# Patient Record
Sex: MALE | Race: WHITE
[De-identification: names, ages, dates, MRNs, and addresses within clinical notes are randomized per-mention and may not be internally consistent; named-entity substitution may affect disease eponyms.]

---

## 2018-02-15 ENCOUNTER — HOSPITAL ENCOUNTER (EMERGENCY)
Dept: HOSPITAL 25 - UCCORT | Age: 23
Discharge: HOME | End: 2018-02-15
Payer: SELF-PAY

## 2018-02-15 VITALS — DIASTOLIC BLOOD PRESSURE: 62 MMHG | SYSTOLIC BLOOD PRESSURE: 122 MMHG

## 2018-02-15 DIAGNOSIS — Z88.2: ICD-10-CM

## 2018-02-15 DIAGNOSIS — L02.416: Primary | ICD-10-CM

## 2018-02-15 DIAGNOSIS — H91.90: ICD-10-CM

## 2018-02-15 DIAGNOSIS — Z88.0: ICD-10-CM

## 2018-02-15 PROCEDURE — 99212 OFFICE O/P EST SF 10 MIN: CPT

## 2018-02-15 PROCEDURE — G0463 HOSPITAL OUTPT CLINIC VISIT: HCPCS

## 2018-02-15 PROCEDURE — 87205 SMEAR GRAM STAIN: CPT

## 2018-02-15 PROCEDURE — 87640 STAPH A DNA AMP PROBE: CPT

## 2018-02-15 PROCEDURE — 87641 MR-STAPH DNA AMP PROBE: CPT

## 2018-02-15 PROCEDURE — 87070 CULTURE OTHR SPECIMN AEROBIC: CPT

## 2018-02-15 NOTE — UC
Skin Complaint HPI





- HPI Summary


HPI Summary: 





21 y/o male presents to the urgent care 





- History of Current Complaint


Chief Complaint: UCEar


Time Seen by Provider: 02/15/18 16:56


Stated Complaint: EAR COMPLAINT


Pain Intensity: 0





- Allergy/Home Medications


Allergies/Adverse Reactions: 


 Allergies











Allergy/AdvReac Type Severity Reaction Status Date / Time


 


Penicillins Allergy  Unknown Verified 02/15/18 16:53





   Reaction  





   Details  











Home Medications: 


 Home Medications





Sulfamethox/Trimethoprim DS* [Bactrim /160 TAB*] 1 tab PO BID 02/15/18 [

History Confirmed 02/15/18]











PMH/Surg Hx/FS Hx/Imm Hx





- Surgical History


Surgical History: Yes


Surgery Procedure, Year, and Place: Jaw Fracture





- Family History


Known Family History: Positive: None, Cardiac Disease





- Social History


Alcohol Use: Weekly


Alcohol Amount: 10


Substance Use Type: Marijuana, Other


Substance Use Comment - Amount & Last Used: yesterday


Smoking Status (MU): Never Smoked Tobacco





- Immunization History


Most Recent Influenza Vaccination: Not the 2015/2016 Season





Physical Exam


Vital Signs: 


 Initial Vital Signs











Temp  99.2 F   02/15/18 17:07


 


Pulse  98   02/15/18 17:07


 


Resp  18   02/15/18 17:07


 


BP  122/62   02/15/18 17:07


 


Pulse Ox  100   02/15/18 17:07














Course/Dx





- Differential Diagnoses - Skin Complaint


Differential Diagnoses: Abscess





- Diagnoses


Provider Diagnoses: 1-left thight abscess.  2-hearing loss





Discharge





- Discharge Plan


Condition: Stable


Disposition: HOME


Prescriptions: 


Acetaminophen TAB* [Tylenol TAB*] 650 mg PO Q4H PRN #20 tab


 PRN Reason: Pain


Bacitracin OINTMENT* 1 applic TOPICAL TID #1 tube


Patient Education Materials:  Hearing Loss (ED), Abscess (ED)


Referrals: 


[facility] [Outside] - 1 Day


Cryer,Jonathan, MD [Medical Doctor] - 3 Days


Additional Instructions: 


1-Please continue taking full course of antibiotic to avoid resistance. Keep 

wound clean and dry with a sterile dressing. Apply bacitracin topical as 

directed


2- F/u your appt tomorrow for wound check up at the [facility]  or at 

the urgent care for removal of packing


3-. Take Tylenol  PO q4-6hrs prn for pain or swelling. 


4-If you develop fever or redness despite antibiotic please go to the ER 

immediately or return to the Urgent care.


5- Wound culture sent to lab, if any abnormal result you will receive a call 

from us.


6-F/u ENT referral for your hearing loss

## 2018-02-15 NOTE — UC
Ear Complaint HPI





- HPI Summary


HPI Summary: 





23 y/o male presents to the urgent care c/o


Right ear fullness, or water in ear sensation since 2/9/18. Pt has hearing loss 

in left ear since age 18yrs. due to DM abuse.


Left thigh lesion currently being treated with Bactrim for suspected MRSA; pt 

had lesion looked at today by [facility] staff on 2nd visit. 

Pt states he is extremely anxious about losing hearing in right ear. Hx of 

anxiety depression; has been off antianxiety med for 2 months





- History of Current Complaint


Chief Complaint: UCEar


Stated Complaint: EAR COMPLAINT


Time Seen by Provider: 02/15/18 16:56


Hx Obtained From: Patient


Pain Intensity: 0





- Allergies/Home Medications


Allergies/Adverse Reactions: 


 Allergies











Allergy/AdvReac Type Severity Reaction Status Date / Time


 


Penicillins Allergy  Unknown Verified 02/15/18 16:53





   Reaction  





   Details  











Home Medications: 


 Home Medications





Sulfamethox/Trimethoprim DS* [Bactrim /160 TAB*] 1 tab PO BID 02/15/18 [

History Confirmed 02/15/18]











PMH/Surg Hx/FS Hx/Imm Hx





- Surgical History


Surgical History: Yes


Surgery Procedure, Year, and Place: Jaw Fracture





- Family History


Known Family History: Positive: None, Cardiac Disease





- Social History


Alcohol Use: Weekly


Alcohol Amount: 10


Substance Use Type: Marijuana, Other


Substance Use Comment - Amount & Last Used: yesterday


Smoking Status (MU): Never Smoked Tobacco





- Immunization History


Most Recent Influenza Vaccination: Not the 2015/2016 Season





Physical Exam


Vital Signs: 


 Initial Vital Signs











Temp  99.2 F   02/15/18 17:07


 


Pulse  98   02/15/18 17:07


 


Resp  18   02/15/18 17:07


 


BP  122/62   02/15/18 17:07


 


Pulse Ox  100   02/15/18 17:07














Discharge





- Discharge Plan


Referrals: 


Non Staff,Doctor [Primary Care Provider] -

## 2018-02-17 ENCOUNTER — HOSPITAL ENCOUNTER (EMERGENCY)
Dept: HOSPITAL 25 - UCCORT | Age: 23
Discharge: HOME | End: 2018-02-17
Payer: SELF-PAY

## 2018-02-17 VITALS — DIASTOLIC BLOOD PRESSURE: 71 MMHG | SYSTOLIC BLOOD PRESSURE: 124 MMHG

## 2018-02-17 DIAGNOSIS — Z88.0: ICD-10-CM

## 2018-02-17 DIAGNOSIS — Z09: Primary | ICD-10-CM

## 2018-02-17 DIAGNOSIS — Z86.14: ICD-10-CM

## 2018-02-17 DIAGNOSIS — L02.416: ICD-10-CM

## 2018-02-17 PROCEDURE — G0463 HOSPITAL OUTPT CLINIC VISIT: HCPCS

## 2018-02-17 PROCEDURE — 99211 OFF/OP EST MAY X REQ PHY/QHP: CPT

## 2018-02-17 NOTE — ED
Skin Complaint





- HPI Summary


HPI Summary: 





23 y/o male presents to the urgent care for recheck on his left thigh abscess. 

Pt states he went to the [facility] and NP repacked his 

abscess. He still taking Bactrim PO. He wants to make sure his wound is 

improving. Pain is 2/10 at touch. Pt denies fever, drainage, SOB, red streaks, 

chest pain, abdominal pain, N/V/D








- History of Current Complaint


Chief Complaint: UCSkin


Time Seen by Provider: 02/17/18 12:52


Stated Complaint: WOUND REPACKING


Hx Obtained From: Patient


Onset/Duration: Started Days Ago - 5 days, Still Present


Skin Exposure Onset/Duration: Days Ago - 5 days


Timing: Constant


Onset Severity: Moderate


Current Severity: Mild


Pain Intensity: 2


Pain Scale Used: 0-10 Numeric


Skin Location: Discrete - left thigh abscess


Character: Redness


Aggravating Symptom(s): Touch


Alleviating Symptom(s): OTC Meds, Other: - ABXs


Associated Signs & Symptoms: Rash


Related History: Other: - Hx of MRSA





- Allergy/Home Medications


Allergies/Adverse Reactions: 


 Allergies











Allergy/AdvReac Type Severity Reaction Status Date / Time


 


Penicillins Allergy  Unknown Verified 02/17/18 12:55





   Reaction  





   Details  














PMH/Surg Hx/FS Hx/Imm Hx


Previously Healthy: Yes - Pt denies PMHX





- Surgical History


Surgery Procedure, Year, and Place: Jaw Fracture





- Immunization History


Immunizations Up to Date: Yes


Infectious Disease History: No


Infectious Disease History: 


   Denies: Traveled Outside the [place] in Last 30 Days





- Family History


Known Family History: Positive: Cardiac Disease


Family History: Dyslipidemia





- Social History


Occupation: Student


Lives: With Family


Alcohol Use: Weekly


Alcohol Amount: 10


Substance Use Type: Reports: Marijuana


Substance Use Comment - Amount & Last Used: yesterday


Smoking Status (MU): Never Smoked Tobacco





Review of Systems


Constitutional: Negative


Eyes: Negative


ENT: Negative


Cardiovascular: Negative


Respiratory: Negative


Gastrointestinal: Negative


Genitourinary: Negative


Musculoskeletal: Negative


Positive: Other - Left thigh abscess packing re-check


Neurological: Negative


Psychological: Normal


All Other Systems Reviewed And Are Negative: Yes





Physical Exam





- Summary


Physical Exam Summary: 





Vital Signs Reviewed: Yes


General: well developed, well nourished male sitting in the examining table w/o 

any apparent distress


Eye Exam: Normal


Eyes: Positive: Conjunctiva Clear - PERRLA, EOMI, fundi grossly normal


ENT: Positive: Normal ENT inspection, Hearing grossly normal, Pharynx normal, 

TMs normal


Neck: Positive: Supple, Nontender, No Lymphadenopathy


Respiratory: Positive: Chest non-tender, Lungs clear, Normal breath sounds, No 

respiratory distress


Cardiovascular: Positive: RRR, No Murmur, Pulses Normal, Brisk Capillary Refill


Abdomen Description: Positive: Nontender, No Organomegaly, Soft.  Negative: CVA 

Tenderness (R), CVA Tenderness (L)


Bowel Sounds: Positive: Present


Musculoskeletal: Positive: Strength Intact, ROM Intact, No Edema


Neurological: Positive: Alert, Muscle Tone Normal


Psychological Exam: Normal


Skin: Positive:Lateral side of mid thigh w/ an abscess packing in placed, 

granulation observed mild  erythema around abscess, mild tenderness to palaption

, no drainage observed. FROM of LF extremity, sensation is intact, capillary 

refill WNL, reflexes WNL











Triage Information Reviewed: Yes


Vital Signs On Initial Exam: 


 Initial Vitals











Temp Pulse Resp BP Pulse Ox


 


 98 F   70   16   124/71   97 


 


 02/17/18 12:53  02/17/18 12:53  02/17/18 12:53  02/17/18 12:53  02/17/18 12:53














Diagnostics





- Vital Signs


 Vital Signs











  Temp Pulse Resp BP Pulse Ox


 


 02/17/18 12:53  98 F  70  16  124/71  97














- Laboratory


Lab Statement: Any lab studies that have been ordered have been reviewed, and 

results considered in the medical decision making process.





Course/Dx





- Course


Course Of Treatment: 23 y/o male presents to the urgent care for recheck on his 

left thigh abscess. Pt states he went to the [facility] and NP 

repacked his abscess. He still taking Bactrim PO. He wants to make sure his 

wound is improving. Pain is 2/10 at touch. Pt denies fever, drainage, SOB, red 

streaks, chest pain, abdominal pain, N/V/D. Hx obtained. Pt 's lateral side mid 

thigh abscess healing well. Packing removed w/o any diffiuculty. normal 

granulation observed, no erythema or induration observed, mild tenderness to 

palpation. Pt tolerated well procedure. Topical bacitracin applied over and 

wound covered with sterile gauze. Wound culture positive for MRSA and Staph 

Aureus. Pt advised to finish full course of Bactrim PO.  Wound D/C instructions 

given to PT. Pt understands and agreed with plan of care.





- Differential Diagnoses - Skin Complaint


Differential Diagnoses: Abscess





- Diagnoses


Provider Diagnoses: 


 Encounter for recheck of abscess following incision and drainage








Discharge





- Discharge Plan


Condition: Stable


Disposition: HOME


Patient Education Materials:  Acute Wound Care (ED), Abscess (ED)


Referrals: 


[facility] [Outside] - If Needed


Additional Instructions: 


1-Please continue taking full course of antibiotic to avoid resistance. Keep 

wound clean and dry with a sterile dressing. Apply bacitracin topical as 

directed


2- Take Tylenol PO  q4-6hrs prn for pain or swelling. 


3-If you develop fever or redness despite antibiotic  please go to the ER 

immediately or return to the Urgent care.

## 2021-08-16 ENCOUNTER — APPOINTMENT (OUTPATIENT)
Dept: PHARMACY | Facility: CLINIC | Age: 26
End: 2021-08-16
Payer: SELF-PAY

## 2021-08-16 ENCOUNTER — APPOINTMENT (OUTPATIENT)
Dept: OTOLARYNGOLOGY | Facility: CLINIC | Age: 26
End: 2021-08-16
Payer: COMMERCIAL

## 2021-08-16 VITALS
HEIGHT: 74 IN | HEART RATE: 89 BPM | DIASTOLIC BLOOD PRESSURE: 54 MMHG | WEIGHT: 180 LBS | BODY MASS INDEX: 23.1 KG/M2 | SYSTOLIC BLOOD PRESSURE: 149 MMHG

## 2021-08-16 PROCEDURE — V5299A: CUSTOM | Mod: NC

## 2021-08-16 PROCEDURE — 99213 OFFICE O/P EST LOW 20 MIN: CPT

## 2021-08-16 PROCEDURE — 92567 TYMPANOMETRY: CPT

## 2021-08-16 PROCEDURE — 92557 COMPREHENSIVE HEARING TEST: CPT

## 2021-08-16 NOTE — HISTORY OF PRESENT ILLNESS
[de-identified] : 26 yr old male w profound SNHL since 18 yrs old, CROS [place].\par -hx otitis, head trauma, FH\par +noise exp (wears ear protection)

## 2021-08-16 NOTE — REVIEW OF SYSTEMS
[Sneezing] : sneezing [Seasonal Allergies] : seasonal allergies [Post Nasal Drip] : post nasal drip [Hearing Loss] : hearing loss [Dizziness] : dizziness [Vertigo] : vertigo [Ear Drainage] : ear drainage [Anxiety] : anxiety [Depression] : depression [Negative] : Heme/Lymph [FreeTextEntry1] : weight loss,  daytime sleepiness

## 2021-08-16 NOTE — DATA REVIEWED
[de-identified] : wears CROS system\par - Type A tymp AS; Type Ad tymp AD\par - Results obtained via insert earphones revealed\par AD: hearing -8000 Hz \par AS: profound SNHL 250-8000 Hz\par \par rec: 1) ent f/u 2) re-eval as per md 3) hack - more consistent use of CROS system

## 2021-08-16 NOTE — ASSESSMENT
[FreeTextEntry1] :   AD WNL w type Ad, AS profound SNHL w type A\par ear protection\par eval for poss cochlear implant

## 2021-08-27 ENCOUNTER — APPOINTMENT (OUTPATIENT)
Dept: OTOLARYNGOLOGY | Facility: CLINIC | Age: 26
End: 2021-08-27
Payer: COMMERCIAL

## 2021-08-27 DIAGNOSIS — H90.42 SENSORINEURAL HEARING LOSS, UNILATERAL, LEFT EAR, WITH UNRESTRICTED HEARING ON THE CONTRALATERAL SIDE: ICD-10-CM

## 2021-08-27 DIAGNOSIS — H93.292 OTHER ABNORMAL AUDITORY PERCEPTIONS, LEFT EAR: ICD-10-CM

## 2021-08-27 DIAGNOSIS — H91.22 SUDDEN IDIOPATHIC HEARING LOSS, LEFT EAR: ICD-10-CM

## 2021-08-27 DIAGNOSIS — H93.12 TINNITUS, LEFT EAR: ICD-10-CM

## 2021-08-27 PROCEDURE — 99214 OFFICE O/P EST MOD 30 MIN: CPT | Mod: 25

## 2021-08-27 PROCEDURE — 92504 EAR MICROSCOPY EXAMINATION: CPT

## 2021-08-28 PROBLEM — H93.12 LEFT-SIDED TINNITUS: Status: ACTIVE | Noted: 2021-08-28

## 2021-08-28 PROBLEM — H91.22 SUDDEN LEFT HEARING LOSS: Status: ACTIVE | Noted: 2021-08-28

## 2021-08-28 PROBLEM — H93.292 ABNORMAL AUDITORY PERCEPTION OF LEFT EAR: Status: ACTIVE | Noted: 2021-08-28

## 2021-08-28 PROBLEM — H90.42 SENSORINEURAL HEARING LOSS (SNHL) OF LEFT EAR WITH UNRESTRICTED HEARING OF RIGHT EAR: Status: ACTIVE | Noted: 2021-08-16

## 2021-08-28 NOTE — REASON FOR VISIT
[Initial Consultation] : an initial consultation for [FreeTextEntry2] : internal referral by Dr Elizabeth for left hearing loss

## 2021-08-28 NOTE — HISTORY OF PRESENT ILLNESS
[de-identified] : 26 year old male internal referral by Dr Elizabeth for hearing loss, sudden left profound SNHL since 18 years of age. Reports being sick at the time and hearing in the left ear completely went out. States he was seen by a doctor, given steroids without relief. Reports left tinnitus most of the time, fluctuating in volume.  Denies otalgia, otorrhea, dizziness, ear infections. \par Reports MRI done 2019

## 2021-11-29 ENCOUNTER — APPOINTMENT (OUTPATIENT)
Dept: OTOLARYNGOLOGY | Facility: CLINIC | Age: 26
End: 2021-11-29